# Patient Record
Sex: MALE | Race: BLACK OR AFRICAN AMERICAN
[De-identification: names, ages, dates, MRNs, and addresses within clinical notes are randomized per-mention and may not be internally consistent; named-entity substitution may affect disease eponyms.]

---

## 2021-03-10 ENCOUNTER — HOSPITAL ENCOUNTER (OUTPATIENT)
Dept: HOSPITAL 35 - SJCVC | Age: 81
End: 2021-03-10
Attending: INTERNAL MEDICINE
Payer: COMMERCIAL

## 2021-03-10 DIAGNOSIS — E78.00: ICD-10-CM

## 2021-03-10 DIAGNOSIS — R06.02: ICD-10-CM

## 2021-03-10 DIAGNOSIS — I35.0: ICD-10-CM

## 2021-03-10 DIAGNOSIS — K21.9: ICD-10-CM

## 2021-03-10 DIAGNOSIS — I10: ICD-10-CM

## 2021-03-10 DIAGNOSIS — E78.5: ICD-10-CM

## 2021-03-10 DIAGNOSIS — Z79.899: ICD-10-CM

## 2021-03-10 DIAGNOSIS — Z13.220: Primary | ICD-10-CM

## 2021-03-10 DIAGNOSIS — E11.9: ICD-10-CM

## 2021-03-10 DIAGNOSIS — Z79.82: ICD-10-CM

## 2021-03-19 ENCOUNTER — HOSPITAL ENCOUNTER (OUTPATIENT)
Dept: HOSPITAL 35 - SJCVCIMAG | Age: 81
End: 2021-03-19
Attending: INTERNAL MEDICINE
Payer: COMMERCIAL

## 2021-03-19 DIAGNOSIS — R00.0: Primary | ICD-10-CM

## 2021-03-19 DIAGNOSIS — Z79.82: ICD-10-CM

## 2021-03-19 DIAGNOSIS — K21.9: ICD-10-CM

## 2021-03-19 DIAGNOSIS — I49.3: ICD-10-CM

## 2021-03-19 DIAGNOSIS — Z79.899: ICD-10-CM

## 2021-03-19 DIAGNOSIS — I10: ICD-10-CM

## 2021-03-19 DIAGNOSIS — E11.9: ICD-10-CM

## 2021-03-19 DIAGNOSIS — E78.00: ICD-10-CM

## 2021-03-19 DIAGNOSIS — E78.5: ICD-10-CM

## 2021-03-19 DIAGNOSIS — I35.0: ICD-10-CM

## 2021-09-30 ENCOUNTER — HOSPITAL ENCOUNTER (OUTPATIENT)
Dept: HOSPITAL 35 - LAB | Age: 81
End: 2021-09-30
Payer: COMMERCIAL

## 2021-09-30 DIAGNOSIS — Z20.822: ICD-10-CM

## 2021-09-30 DIAGNOSIS — Z01.812: Primary | ICD-10-CM

## 2021-10-04 ENCOUNTER — HOSPITAL ENCOUNTER (OUTPATIENT)
Dept: HOSPITAL 35 - OR | Age: 81
Discharge: HOME | End: 2021-10-04
Attending: ORTHOPAEDIC SURGERY
Payer: COMMERCIAL

## 2021-10-04 ENCOUNTER — HOSPITAL ENCOUNTER (EMERGENCY)
Dept: HOSPITAL 35 - ER | Age: 81
Discharge: HOME | End: 2021-10-04
Payer: COMMERCIAL

## 2021-10-04 VITALS — DIASTOLIC BLOOD PRESSURE: 79 MMHG | SYSTOLIC BLOOD PRESSURE: 160 MMHG

## 2021-10-04 VITALS — WEIGHT: 185 LBS | BODY MASS INDEX: 26.48 KG/M2 | HEIGHT: 70 IN

## 2021-10-04 VITALS — DIASTOLIC BLOOD PRESSURE: 74 MMHG | SYSTOLIC BLOOD PRESSURE: 149 MMHG

## 2021-10-04 VITALS — WEIGHT: 185.01 LBS | HEIGHT: 70 IN | BODY MASS INDEX: 26.49 KG/M2

## 2021-10-04 VITALS — SYSTOLIC BLOOD PRESSURE: 149 MMHG | DIASTOLIC BLOOD PRESSURE: 74 MMHG

## 2021-10-04 DIAGNOSIS — M23.251: Primary | ICD-10-CM

## 2021-10-04 DIAGNOSIS — G89.18: Primary | ICD-10-CM

## 2021-10-04 DIAGNOSIS — Z79.899: ICD-10-CM

## 2021-10-04 DIAGNOSIS — K21.9: ICD-10-CM

## 2021-10-04 DIAGNOSIS — I10: ICD-10-CM

## 2021-10-04 DIAGNOSIS — E11.9: ICD-10-CM

## 2021-10-04 DIAGNOSIS — Z88.8: ICD-10-CM

## 2021-10-04 DIAGNOSIS — Z90.49: ICD-10-CM

## 2021-10-04 DIAGNOSIS — E78.5: ICD-10-CM

## 2021-10-04 DIAGNOSIS — Z79.82: ICD-10-CM

## 2021-10-04 DIAGNOSIS — M23.41: ICD-10-CM

## 2021-10-04 DIAGNOSIS — Z87.442: ICD-10-CM

## 2021-10-04 DIAGNOSIS — Z98.890: ICD-10-CM

## 2021-10-04 DIAGNOSIS — M23.231: ICD-10-CM

## 2021-10-04 DIAGNOSIS — M94.261: ICD-10-CM

## 2021-10-04 DIAGNOSIS — M25.561: ICD-10-CM

## 2021-10-04 PROCEDURE — 58577: CPT

## 2021-10-04 PROCEDURE — 62900: CPT

## 2021-10-04 PROCEDURE — 50405 REVISION OF KIDNEY/URETER: CPT

## 2021-10-04 PROCEDURE — 50101: CPT

## 2021-10-04 PROCEDURE — 58589: CPT

## 2021-10-04 PROCEDURE — 57103: CPT

## 2021-10-04 PROCEDURE — 70005: CPT

## 2021-10-04 PROCEDURE — 57255: CPT

## 2021-10-04 PROCEDURE — 56526: CPT

## 2021-10-04 PROCEDURE — 62110: CPT

## 2021-10-04 PROCEDURE — 50010 RENAL EXPLORATION: CPT

## 2021-10-04 NOTE — O
[facility]
[address]ella [address]
[place]   [address]                     OPERATIVE REPORT              
_______________________________________________________________________________
 
Name:       BENJAMÍN PADRON       Room #:         150-1       [facility] 
M.R.#:      [id number]                       Account #:      [id number]  
Admission:  10/04/21    Attend Phys:    Jim Waldrop MD   
Discharge:              Date of Birth:  [birth year]  
                                                          Report #: [id number]
                                                                    [id number] 
_______________________________________________________________________________
THIS REPORT FOR:  
 
cc:  Gen Henley MD, Steven A. MD Clymer,Jim MENDOZA MD                                           ~
 
DATE OF SERVICE: 10/04/2021
 
PREOPERATIVE DIAGNOSES:  Right knee medial meniscus tear and lateral meniscus 
tear with mild generalized degenerative chondromalacia.
 
POSTOPERATIVE DIAGNOSES:  Right knee medial meniscus tear and lateral meniscus 
tear with mild generalized degenerative chondromalacia.
 
PROCEDURES:  Right knee arthroscopy with partial medial meniscectomy and partial
lateral meniscectomy and limited chondroplasty and removal of loose bodies.
 
SURGEON:  Jim Waldrop MD
 
INDICATIONS:  This 81-year-old gentleman, appears quite fit and healthy and 
seems much younger than his stated age.  He remains active and fully 
independent.  He has had some bilateral knee problems and underwent left knee 
arthroscopy in the past with excellent result.  He has similar problems on the 
right side.  MRI study reveals evidence of some medial and lateral meniscus 
damage as well as mild chondromalacia.  Given his advanced age, we have 
discussed going ahead with total knee replacement, but the patient and wife feel
his symptoms are mild and would prefer to avoid knee replacement.  They noted he
had an excellent result with arthroscopic debridement on the opposite side 
several years ago.  Given this, they have elected to go ahead with right knee 
arthroscopy.
 
DESCRIPTION OF PROCEDURE:  The patient was taken to the operating room where he 
was placed under brief general anesthetic.  The right knee and leg were 
meticulously prepped and draped.  A thigh tourniquet was applied and inflated to
300 mmHg.  A lateral suprapatellar inflow cannula was placed, the arthroscope 
and shaver were introduced through parapatellar tendon approaches.  The various 
compartments were sequentially visualized and documented with arthroscopic 
photography.
 
There was some generalized synovial hypertrophy and some loose debris in the 
joint as well as two moderate-sized loose bodies, which were morcellized and 
removed.  The knee was aggressively irrigated and the synovial hypertrophy was 
debrided, allowing good visualization.  The medial compartment revealed mild 
chondromalacia on the medial femoral condyle and the medial tibial plateau.  
There is moderate irregular degenerative tearing in the mid and posterior aspect
of the medial meniscus, which was debrided with a small shaver.  The [address]   [address]                     OPERATIVE REPORT              
_______________________________________________________________________________
 
Name:       BENJAMÍN PADRON       Room #:         150-1       [facility] 
M.R.#:      [id number]                       Account #:      [id number]  
Admission:  10/04/21    Attend Phys:    Jim Waldrop MD   
Discharge:              Date of Birth:  [birth year]  
                                                          Report #: [id number]
                                                                    [id number] 
_______________________________________________________________________________
 
to two-thirds of the meniscus are still in good shape and functional.  No other 
problems in the medial compartment were identified.
 
The lateral compartment reveals more severe chondromalacia involving the lateral
tibial plateau and minor chondromalacia on the lateral femoral condyle.  There 
is somewhat more significant generalized degenerative tearing of the lateral 
meniscus involving about the inner one-half of the meniscus from the 
anterolateral aspect back to the posterior horn.  This area of meniscus was 
debrided back to a smooth, stable outer margin.  The areas of chondromalacia 
were also very gently debrided removing only the loose irregular cartilage 
leaving as much good cartilage in place as possible.
 
The intercondylar notch reveals mild synovial hypertrophy and mild bony 
spurring.  The cruciate ligaments appeared to be intact and stable.  Only 
limited debridement here was necessary.  The patellofemoral articulation reveals
mild grade I or grade II chondromalacia on the patella and a few areas of deeper
damage on the trochlea of the distal femur.  In general, however, the patella 
seems to be in good shape and is tracking nicely and appears stable.  Limited 
debridement here was performed.  The suprapatellar pouch revealed some synovial 
hypertrophy and a few small loose bodies, which were debrided.
 
The entire knee was copiously irrigated.  All excess fluid was evacuated of the 
knee.  The knee was injected with 80 mg of Depo-Medrol and 20 mL of 0.5% 
Marcaine with epinephrine.  The puncture sites were closed with interrupted 
nylon suture.  A sterile dressing was applied.  The patient was awakened and 
returned to recovery room in good condition.
 
 
 
 
 
 
 
 
 
 
 
 
 
 
 
 
 
  <ELECTRONICALLY SIGNED>
   By: Jim Waldrop MD           
  10/04/21     1003
D: 10/04/21 0743                           _____________________________________
T: 10/04/21 0759                           Jim Waldrop MD             /nt

## 2021-10-04 NOTE — EKG
[address]  [address]
Phone:  [phone]                    ELECTROCARDIOGRAM REPORT      
_______________________________________________________________________________
 
Name:       BENJAMÍN PADRON       Room #:         [address]#:      [id number]     Account #:      [id number]  
Admission:  10/04/21    Attend Phys:    Jim Waldrop MD   
Discharge:              Date of Birth:  [birth year]  
                                                          Report #: [id number]
   [id number]
_______________________________________________________________________________
                          [facility]
                                       
Test Date:    2021-10-04               Test Time:    06:52:41
Pat Name:     BENJAMÍN PADRON         Department:   
Patient ID:   [id number]            Room:         150 
Gender:       M                        Technician:   MOHSEN
:          1940               Requested By: Jim Waldrop
Order Number: [id number] MD:   Vince Hogue
                                 Measurements
Intervals                              Axis          
Rate:         74                       P:            93
IN:           174                      QRS:          44
QRSD:         88                       T:            22
QT:           382                                    
QTc:          424                                    
                           Interpretive Statements
Sinus rhythm
No significant abnormality
Compared to ECG 2014 15:47:01
Sinus bradycardia no longer present
Electronically Signed On 10-4-2021 7:31:53 CDT by Vince Hogue
[email or web address]
 
 
 
 
 
 
 
 
 
 
 
 
 
 
 
 
 
 
 
 
 
  <ELECTRONICALLY SIGNED>
   By: Vince Hogue MD, [facility]   
  10/04/21     0731
D: 10/04/21 0652                           _____________________________________
T: 10/04/21 0652                           Vince Hogue MD, FACC     /EPI